# Patient Record
Sex: FEMALE | Race: WHITE
[De-identification: names, ages, dates, MRNs, and addresses within clinical notes are randomized per-mention and may not be internally consistent; named-entity substitution may affect disease eponyms.]

---

## 2019-10-02 ENCOUNTER — HOSPITAL ENCOUNTER (EMERGENCY)
Dept: HOSPITAL 77 - KA.ED | Age: 34
Discharge: HOME | End: 2019-10-02
Payer: COMMERCIAL

## 2019-10-02 DIAGNOSIS — Z79.899: ICD-10-CM

## 2019-10-02 DIAGNOSIS — M62.838: Primary | ICD-10-CM

## 2019-10-02 DIAGNOSIS — Z88.8: ICD-10-CM

## 2019-10-02 DIAGNOSIS — F17.210: ICD-10-CM

## 2019-10-02 DIAGNOSIS — F41.9: ICD-10-CM

## 2019-10-02 DIAGNOSIS — J45.909: ICD-10-CM

## 2019-10-02 PROCEDURE — 99283 EMERGENCY DEPT VISIT LOW MDM: CPT

## 2019-10-02 PROCEDURE — 96372 THER/PROPH/DIAG INJ SC/IM: CPT

## 2019-10-02 NOTE — EDM.PDOC
ED HPI GENERAL MEDICAL PROBLEM





- General


Chief Complaint: General


Stated Complaint: NECK PAIN WITH R ARM PAIN


Time Seen by Provider: 10/02/19 15:19


Source of Information: Reports: Patient


History Limitations: Reports: No Limitations





- History of Present Illness


INITIAL COMMENTS - FREE TEXT/NARRATIVE: 





Patient presents with right lateral neck pain that started two days ago when 

she awoke.  No pain midline or on the left.  There was some tingling down her 

right arm a couple days ago but better now.  No weakness.  She has DJD in her 

back and just yesterday started Baclofen.  She has used Flexeril occasionally 

in the past but it makes her too drowsy to take when she works.  She had a 

gastric bypass and cannot take NSAIDS orally, but IV or IM are okay.


  ** Right Shoulder


Pain Score (Numeric/FACES): 9





- Related Data


 Allergies











Allergy/AdvReac Type Severity Reaction Status Date / Time


 


NSAIDS (Non-Steroidal Allergy  Other Verified 10/02/19 15:10





Anti-Inflamma     











Home Meds: 


 Home Meds





Amitriptyline [Elavil] 10 mg PO BEDTIME 10/02/19 [History]


Baclofen 10 mg PO TID 10/02/19 [History]


Calcium Citrate 500 mg PO BID 10/02/19 [History]


Cyclobenzaprine [Flexeril] 10 mg PO DAILY PRN 10/02/19 [History]


Fish Oil/Omega-3 Fatty Acids [Fish Oil 1,000 MG] 1,000 mg PO BID 10/02/19 [

History]


Gabapentin [Neurontin] 400 mg PO BID 10/02/19 [History]


Multivitamin W/Iron, Minerals [Theratrum Complete 50 Plus] 1 each PO DAILY 10/02

/19 [History]


Pantoprazole Sodium [Protonix] 20 mg PO DAILY 10/02/19 [History]


Potassium Chloride 20 meq PO DAILY 10/02/19 [History]


Spironolactone [Aldactone] 12.5 mg PO DAILY 10/02/19 [History]











Past Medical History


Cardiovascular History: Reports: None


Respiratory History: Reports: Asthma


Gastrointestinal History: Reports: None


Genitourinary History: Reports: None


OB/GYN History: Reports: None


Musculoskeletal History: Reports: Arthritis, Back Pain, Chronic


Neurological History: Reports: None


Psychiatric History: Reports: Anxiety


Endocrine/Metabolic History: Reports: None


Hematologic History: Reports: None


Immunologic History: Reports: None


Oncologic (Cancer) History: Reports: None


Dermatologic History: Reports: None





- Infectious Disease History


Infectious Disease History: Reports: None





- Past Surgical History


Head Surgeries/Procedures: Reports: None


HEENT Surgical History: Reports: Other (See Below)


Other HEENT Surgeries/Procedures: septoplasty, deaf in left ear


GI Surgical History: Reports: Bariatric Procedure


Female  Surgical History: Reports: None





Social & Family History





- Family History


Family Medical History: Noncontributory





- Tobacco Use


Smoking Status *Q: Current Every Day Smoker


Years of Tobacco use: 12


Packs/Tins Daily: 0.5


Second Hand Smoke Exposure: Yes





- Caffeine Use


Caffeine Use: Reports: Soda





- Recreational Drug Use


Recreational Drug Use: No





ED ROS GENERAL





- Review of Systems


Review Of Systems: See Below


Constitutional: Denies: Fever, Chills, Malaise, Weakness


HEENT: Denies: Ear Pain, Throat Pain, Vision Change


Respiratory: Denies: Shortness of Breath, Cough


Cardiovascular: Denies: Chest Pain, Lightheadedness, Syncope


Endocrine: Denies: Fatigue


GI/Abdominal: Denies: Abdominal Pain, Diarrhea, Vomiting


: Denies: Dysuria, Flank Pain


Musculoskeletal: Reports: Neck Pain, Shoulder Pain.  Denies: Arm Pain, Back 

Pain (chronic; nothing new), Hand Pain


Skin: Denies: Cyanosis, Jaundice, Mottled, Pallor, Diaphoresis


Neurological: Denies: Confusion, Dizziness, Seizure, Syncope, Trouble Speaking, 

Difficulty Walking


Psychiatric: Denies: Agitation, Anxiety





ED EXAM, GENERAL





- Physical Exam


Exam: See Below


Exam Limited By: No Limitations


General Appearance: Alert, WD/WN, No Apparent Distress


Eye Exam: Bilateral Eye: EOMI, Normal Inspection, PERRL


Ears: Normal External Exam, Normal Canal, Hearing Grossly Normal, Normal TMs


Nose: Normal Inspection, No Blood


Throat/Mouth: Normal Inspection, Normal Lips, Normal Voice, No Airway Compromise


Head: Atraumatic, Normocephalic


Neck: Limited Range of Motion (about 35 degrees to left and 45 to right; close 

to normal up and down.), Tender Lateral (right only, along the trapezius to the 

shoulder).  No: Lymphadenopathy (L), Lymphadenopathy (R), Tender Midline


Respiratory/Chest: No Respiratory Distress, Lungs Clear, Normal Breath Sounds


Cardiovascular: Regular Rate, Rhythm, No Murmur


GI/Abdominal: Normal Bowel Sounds, Soft, Non-Tender, No Organomegaly, No 

Distention


Back Exam: Normal Inspection, Full Range of Motion.  No: CVA Tenderness (L), 

CVA Tenderness (R)


Extremities: Normal Inspection, Normal Range of Motion, Non-Tender, No Pedal 

Edema


Neurological: Alert, Oriented, Normal Cognition, No Motor/Sensory Deficits


Psychiatric: Normal Affect, Normal Mood


Skin Exam: Warm, Dry, Intact, Normal Color, No Rash





Course





- Vital Signs


Last Recorded V/S: 





 Last Vital Signs











Temp  99.9 F   10/02/19 15:30


 


Pulse  87   10/02/19 15:30


 


Resp  16   10/02/19 15:30


 


BP  130/84   10/02/19 15:30


 


Pulse Ox  97   10/02/19 15:30














- Orders/Labs/Meds


Meds: 





Medications














Discontinued Medications














Generic Name Dose Route Start Last Admin





  Trade Name Amado  PRN Reason Stop Dose Admin


 


Ketorolac Tromethamine  60 mg  10/02/19 15:52  





  Toradol  IM  10/02/19 15:53  





  ONETIME ONE   





     





     





     





     














- Re-Assessments/Exams


Free Text/Narrative Re-Assessment/Exam: 





10/02/19 16:14


Discussed findings and treatment plan.  Patient discharged to home in stable 

condition.





Departure





- Departure


Time of Disposition: 15:55


Disposition: Home, Self-Care 01


Condition: Good


Clinical Impression: 


 Neck muscle spasm








- Discharge Information


Instructions:  Muscle Cramps and Spasms, Easy-to-Read


Referrals: 


Oksana Sheehan PA-C [Primary Care Provider] - 


Additional Instructions: 


1. Drink 8 cups of water daily.


2. Use your Flexeril every 8 hours for the next couple days.  Avoid use if 

needing to drive or go to work.


3. Follow up with your PCP or a chiropractor of not improving in 2-4 days.  

Recheck sooner if worsening.